# Patient Record
Sex: MALE | Race: WHITE | NOT HISPANIC OR LATINO
[De-identification: names, ages, dates, MRNs, and addresses within clinical notes are randomized per-mention and may not be internally consistent; named-entity substitution may affect disease eponyms.]

---

## 2017-01-09 ENCOUNTER — APPOINTMENT (OUTPATIENT)
Dept: VASCULAR SURGERY | Facility: CLINIC | Age: 82
End: 2017-01-09

## 2017-02-03 ENCOUNTER — APPOINTMENT (OUTPATIENT)
Dept: VASCULAR SURGERY | Facility: CLINIC | Age: 82
End: 2017-02-03

## 2017-02-03 VITALS — DIASTOLIC BLOOD PRESSURE: 73 MMHG | SYSTOLIC BLOOD PRESSURE: 121 MMHG | OXYGEN SATURATION: 97 % | HEART RATE: 73 BPM

## 2017-02-10 ENCOUNTER — APPOINTMENT (OUTPATIENT)
Dept: VASCULAR SURGERY | Facility: CLINIC | Age: 82
End: 2017-02-10

## 2017-02-10 VITALS — HEART RATE: 67 BPM | OXYGEN SATURATION: 98 % | DIASTOLIC BLOOD PRESSURE: 75 MMHG | SYSTOLIC BLOOD PRESSURE: 126 MMHG

## 2017-02-17 ENCOUNTER — APPOINTMENT (OUTPATIENT)
Dept: VASCULAR SURGERY | Facility: CLINIC | Age: 82
End: 2017-02-17

## 2017-02-24 ENCOUNTER — APPOINTMENT (OUTPATIENT)
Dept: VASCULAR SURGERY | Facility: CLINIC | Age: 82
End: 2017-02-24

## 2017-03-03 ENCOUNTER — APPOINTMENT (OUTPATIENT)
Dept: VASCULAR SURGERY | Facility: CLINIC | Age: 82
End: 2017-03-03

## 2017-03-17 ENCOUNTER — APPOINTMENT (OUTPATIENT)
Dept: VASCULAR SURGERY | Facility: CLINIC | Age: 82
End: 2017-03-17

## 2017-03-24 ENCOUNTER — APPOINTMENT (OUTPATIENT)
Dept: VASCULAR SURGERY | Facility: CLINIC | Age: 82
End: 2017-03-24

## 2017-03-31 ENCOUNTER — APPOINTMENT (OUTPATIENT)
Dept: VASCULAR SURGERY | Facility: CLINIC | Age: 82
End: 2017-03-31
Payer: MEDICARE

## 2017-03-31 PROCEDURE — 29580 STRAPPING UNNA BOOT: CPT | Mod: LT

## 2017-03-31 PROCEDURE — 99213 OFFICE O/P EST LOW 20 MIN: CPT | Mod: 25

## 2017-04-07 ENCOUNTER — APPOINTMENT (OUTPATIENT)
Dept: VASCULAR SURGERY | Facility: CLINIC | Age: 82
End: 2017-04-07
Payer: MEDICARE

## 2017-04-07 ENCOUNTER — OUTPATIENT (OUTPATIENT)
Dept: OUTPATIENT SERVICES | Facility: HOSPITAL | Age: 82
LOS: 1 days | End: 2017-04-07
Payer: MEDICARE

## 2017-04-07 PROCEDURE — 99212 OFFICE O/P EST SF 10 MIN: CPT | Mod: 25

## 2017-04-07 PROCEDURE — 29580 STRAPPING UNNA BOOT: CPT | Mod: RT

## 2017-04-07 PROCEDURE — 74177 CT ABD & PELVIS W/CONTRAST: CPT

## 2017-04-07 PROCEDURE — 74177 CT ABD & PELVIS W/CONTRAST: CPT | Mod: 26

## 2017-04-14 ENCOUNTER — APPOINTMENT (OUTPATIENT)
Dept: VASCULAR SURGERY | Facility: CLINIC | Age: 82
End: 2017-04-14
Payer: MEDICARE

## 2017-04-14 VITALS — DIASTOLIC BLOOD PRESSURE: 79 MMHG | HEART RATE: 64 BPM | SYSTOLIC BLOOD PRESSURE: 131 MMHG | OXYGEN SATURATION: 97 %

## 2017-04-14 PROCEDURE — 99212 OFFICE O/P EST SF 10 MIN: CPT | Mod: 25

## 2017-04-14 PROCEDURE — 29580 STRAPPING UNNA BOOT: CPT | Mod: LT

## 2017-04-21 ENCOUNTER — APPOINTMENT (OUTPATIENT)
Dept: VASCULAR SURGERY | Facility: CLINIC | Age: 82
End: 2017-04-21
Payer: MEDICARE

## 2017-04-21 VITALS — OXYGEN SATURATION: 94 % | HEART RATE: 93 BPM | SYSTOLIC BLOOD PRESSURE: 143 MMHG | DIASTOLIC BLOOD PRESSURE: 75 MMHG

## 2017-04-21 PROCEDURE — 29580 STRAPPING UNNA BOOT: CPT | Mod: LT

## 2017-04-21 PROCEDURE — 99212 OFFICE O/P EST SF 10 MIN: CPT | Mod: 25

## 2017-04-28 ENCOUNTER — APPOINTMENT (OUTPATIENT)
Dept: VASCULAR SURGERY | Facility: CLINIC | Age: 82
End: 2017-04-28
Payer: MEDICARE

## 2017-04-28 VITALS — SYSTOLIC BLOOD PRESSURE: 121 MMHG | DIASTOLIC BLOOD PRESSURE: 76 MMHG | OXYGEN SATURATION: 92 % | HEART RATE: 75 BPM

## 2017-04-28 PROCEDURE — 29580 STRAPPING UNNA BOOT: CPT | Mod: 50

## 2017-04-28 PROCEDURE — 99212 OFFICE O/P EST SF 10 MIN: CPT | Mod: 25

## 2017-05-05 ENCOUNTER — APPOINTMENT (OUTPATIENT)
Dept: VASCULAR SURGERY | Facility: CLINIC | Age: 82
End: 2017-05-05
Payer: MEDICARE

## 2017-05-05 PROCEDURE — 29580 STRAPPING UNNA BOOT: CPT | Mod: LT

## 2017-05-05 PROCEDURE — 99212 OFFICE O/P EST SF 10 MIN: CPT | Mod: 25

## 2017-05-12 ENCOUNTER — APPOINTMENT (OUTPATIENT)
Dept: VASCULAR SURGERY | Facility: CLINIC | Age: 82
End: 2017-05-12
Payer: MEDICARE

## 2017-05-12 PROCEDURE — 29580 STRAPPING UNNA BOOT: CPT | Mod: 50

## 2017-05-12 PROCEDURE — 99212 OFFICE O/P EST SF 10 MIN: CPT | Mod: 25

## 2017-05-19 ENCOUNTER — APPOINTMENT (OUTPATIENT)
Dept: VASCULAR SURGERY | Facility: CLINIC | Age: 82
End: 2017-05-19
Payer: MEDICARE

## 2017-05-19 VITALS — OXYGEN SATURATION: 96 % | DIASTOLIC BLOOD PRESSURE: 65 MMHG | SYSTOLIC BLOOD PRESSURE: 105 MMHG | HEART RATE: 76 BPM

## 2017-05-19 PROCEDURE — 99212 OFFICE O/P EST SF 10 MIN: CPT | Mod: 25

## 2017-05-19 PROCEDURE — 29580 STRAPPING UNNA BOOT: CPT | Mod: 50

## 2017-05-26 ENCOUNTER — APPOINTMENT (OUTPATIENT)
Dept: VASCULAR SURGERY | Facility: CLINIC | Age: 82
End: 2017-05-26

## 2017-05-26 ENCOUNTER — APPOINTMENT (OUTPATIENT)
Dept: VASCULAR SURGERY | Facility: CLINIC | Age: 82
End: 2017-05-26
Payer: MEDICARE

## 2017-05-26 PROCEDURE — 29580 STRAPPING UNNA BOOT: CPT | Mod: 50

## 2017-05-26 PROCEDURE — 99211 OFF/OP EST MAY X REQ PHY/QHP: CPT | Mod: 25

## 2017-06-01 ENCOUNTER — RESULT CHARGE (OUTPATIENT)
Age: 82
End: 2017-06-01

## 2017-06-02 ENCOUNTER — APPOINTMENT (OUTPATIENT)
Dept: VASCULAR SURGERY | Facility: CLINIC | Age: 82
End: 2017-06-02
Payer: MEDICARE

## 2017-06-02 VITALS — SYSTOLIC BLOOD PRESSURE: 109 MMHG | OXYGEN SATURATION: 95 % | DIASTOLIC BLOOD PRESSURE: 67 MMHG | HEART RATE: 74 BPM

## 2017-06-02 PROCEDURE — 29580 STRAPPING UNNA BOOT: CPT | Mod: 50

## 2017-06-02 PROCEDURE — 99212 OFFICE O/P EST SF 10 MIN: CPT | Mod: 25

## 2017-06-09 ENCOUNTER — APPOINTMENT (OUTPATIENT)
Dept: VASCULAR SURGERY | Facility: CLINIC | Age: 82
End: 2017-06-09

## 2017-06-09 VITALS — OXYGEN SATURATION: 97 % | DIASTOLIC BLOOD PRESSURE: 68 MMHG | SYSTOLIC BLOOD PRESSURE: 113 MMHG | HEART RATE: 71 BPM

## 2017-06-16 ENCOUNTER — APPOINTMENT (OUTPATIENT)
Dept: VASCULAR SURGERY | Facility: CLINIC | Age: 82
End: 2017-06-16

## 2017-06-23 ENCOUNTER — APPOINTMENT (OUTPATIENT)
Dept: VASCULAR SURGERY | Facility: CLINIC | Age: 82
End: 2017-06-23
Payer: MEDICARE

## 2017-06-23 PROCEDURE — 29580 STRAPPING UNNA BOOT: CPT | Mod: LT

## 2017-06-23 PROCEDURE — 99214 OFFICE O/P EST MOD 30 MIN: CPT | Mod: 25

## 2017-06-30 ENCOUNTER — APPOINTMENT (OUTPATIENT)
Dept: VASCULAR SURGERY | Facility: CLINIC | Age: 82
End: 2017-06-30
Payer: MEDICARE

## 2017-06-30 PROCEDURE — 99212 OFFICE O/P EST SF 10 MIN: CPT | Mod: 25

## 2017-06-30 PROCEDURE — 29580 STRAPPING UNNA BOOT: CPT | Mod: LT

## 2017-07-07 ENCOUNTER — APPOINTMENT (OUTPATIENT)
Dept: VASCULAR SURGERY | Facility: CLINIC | Age: 82
End: 2017-07-07

## 2017-07-07 VITALS — HEART RATE: 75 BPM | SYSTOLIC BLOOD PRESSURE: 116 MMHG | OXYGEN SATURATION: 96 % | DIASTOLIC BLOOD PRESSURE: 68 MMHG

## 2017-08-04 ENCOUNTER — APPOINTMENT (OUTPATIENT)
Dept: VASCULAR SURGERY | Facility: CLINIC | Age: 82
End: 2017-08-04
Payer: MEDICARE

## 2017-08-04 PROCEDURE — 99213 OFFICE O/P EST LOW 20 MIN: CPT

## 2017-09-01 ENCOUNTER — APPOINTMENT (OUTPATIENT)
Dept: VASCULAR SURGERY | Facility: CLINIC | Age: 82
End: 2017-09-01
Payer: MEDICARE

## 2017-09-01 PROCEDURE — 99212 OFFICE O/P EST SF 10 MIN: CPT

## 2017-09-04 RX ORDER — BRIMONIDINE TARTRATE 2 MG/MG
0.2 SOLUTION/ DROPS OPHTHALMIC
Qty: 5 | Refills: 0 | Status: COMPLETED | COMMUNITY
Start: 2017-07-24

## 2018-04-16 ENCOUNTER — APPOINTMENT (OUTPATIENT)
Dept: VASCULAR SURGERY | Facility: CLINIC | Age: 83
End: 2018-04-16
Payer: MEDICARE

## 2018-04-16 PROCEDURE — 29580 STRAPPING UNNA BOOT: CPT | Mod: LT

## 2018-04-16 PROCEDURE — 99212 OFFICE O/P EST SF 10 MIN: CPT | Mod: 25

## 2018-04-23 ENCOUNTER — APPOINTMENT (OUTPATIENT)
Dept: VASCULAR SURGERY | Facility: CLINIC | Age: 83
End: 2018-04-23
Payer: MEDICARE

## 2018-04-23 PROCEDURE — 99213 OFFICE O/P EST LOW 20 MIN: CPT | Mod: 25

## 2018-04-23 PROCEDURE — 29580 STRAPPING UNNA BOOT: CPT | Mod: LT

## 2018-04-30 ENCOUNTER — APPOINTMENT (OUTPATIENT)
Dept: VASCULAR SURGERY | Facility: CLINIC | Age: 83
End: 2018-04-30
Payer: MEDICARE

## 2018-04-30 PROCEDURE — 29580 STRAPPING UNNA BOOT: CPT | Mod: LT

## 2018-04-30 PROCEDURE — 99212 OFFICE O/P EST SF 10 MIN: CPT | Mod: 25

## 2018-05-07 ENCOUNTER — APPOINTMENT (OUTPATIENT)
Dept: VASCULAR SURGERY | Facility: CLINIC | Age: 83
End: 2018-05-07
Payer: MEDICARE

## 2018-05-07 PROCEDURE — 99212 OFFICE O/P EST SF 10 MIN: CPT | Mod: 25

## 2018-05-07 PROCEDURE — 29580 STRAPPING UNNA BOOT: CPT | Mod: LT

## 2018-05-14 ENCOUNTER — APPOINTMENT (OUTPATIENT)
Dept: VASCULAR SURGERY | Facility: CLINIC | Age: 83
End: 2018-05-14
Payer: MEDICARE

## 2018-05-14 PROCEDURE — 99212 OFFICE O/P EST SF 10 MIN: CPT | Mod: 25

## 2018-05-14 PROCEDURE — 29580 STRAPPING UNNA BOOT: CPT | Mod: LT

## 2018-05-21 ENCOUNTER — APPOINTMENT (OUTPATIENT)
Dept: VASCULAR SURGERY | Facility: CLINIC | Age: 83
End: 2018-05-21
Payer: MEDICARE

## 2018-05-21 PROCEDURE — 99212 OFFICE O/P EST SF 10 MIN: CPT

## 2018-05-29 ENCOUNTER — APPOINTMENT (OUTPATIENT)
Dept: VASCULAR SURGERY | Facility: CLINIC | Age: 83
End: 2018-05-29
Payer: MEDICARE

## 2018-05-29 PROCEDURE — 29580 STRAPPING UNNA BOOT: CPT | Mod: LT

## 2018-05-29 PROCEDURE — 99214 OFFICE O/P EST MOD 30 MIN: CPT | Mod: 25

## 2018-06-04 ENCOUNTER — APPOINTMENT (OUTPATIENT)
Dept: VASCULAR SURGERY | Facility: CLINIC | Age: 83
End: 2018-06-04
Payer: MEDICARE

## 2018-06-04 PROCEDURE — 99212 OFFICE O/P EST SF 10 MIN: CPT | Mod: 25

## 2018-06-04 PROCEDURE — 29580 STRAPPING UNNA BOOT: CPT | Mod: LT

## 2018-06-11 ENCOUNTER — APPOINTMENT (OUTPATIENT)
Dept: VASCULAR SURGERY | Facility: CLINIC | Age: 83
End: 2018-06-11
Payer: MEDICARE

## 2018-06-11 PROCEDURE — 29580 STRAPPING UNNA BOOT: CPT | Mod: LT

## 2018-06-11 PROCEDURE — 99212 OFFICE O/P EST SF 10 MIN: CPT | Mod: 25

## 2018-06-19 ENCOUNTER — APPOINTMENT (OUTPATIENT)
Dept: VASCULAR SURGERY | Facility: CLINIC | Age: 83
End: 2018-06-19
Payer: MEDICARE

## 2018-06-19 PROCEDURE — 29580 STRAPPING UNNA BOOT: CPT | Mod: LT

## 2018-06-19 PROCEDURE — 99214 OFFICE O/P EST MOD 30 MIN: CPT | Mod: 25

## 2018-06-26 ENCOUNTER — APPOINTMENT (OUTPATIENT)
Dept: VASCULAR SURGERY | Facility: CLINIC | Age: 83
End: 2018-06-26
Payer: MEDICARE

## 2018-06-26 PROCEDURE — 29580 STRAPPING UNNA BOOT: CPT | Mod: LT

## 2018-06-26 PROCEDURE — 99214 OFFICE O/P EST MOD 30 MIN: CPT | Mod: 25

## 2018-07-02 ENCOUNTER — APPOINTMENT (OUTPATIENT)
Dept: VASCULAR SURGERY | Facility: CLINIC | Age: 83
End: 2018-07-02
Payer: MEDICARE

## 2018-07-02 PROCEDURE — 29580 STRAPPING UNNA BOOT: CPT | Mod: LT

## 2018-07-02 PROCEDURE — 99212 OFFICE O/P EST SF 10 MIN: CPT | Mod: 25

## 2018-07-09 ENCOUNTER — APPOINTMENT (OUTPATIENT)
Dept: VASCULAR SURGERY | Facility: CLINIC | Age: 83
End: 2018-07-09
Payer: MEDICARE

## 2018-07-09 PROCEDURE — 99212 OFFICE O/P EST SF 10 MIN: CPT | Mod: 25

## 2018-07-09 PROCEDURE — 29580 STRAPPING UNNA BOOT: CPT

## 2018-07-16 ENCOUNTER — APPOINTMENT (OUTPATIENT)
Dept: VASCULAR SURGERY | Facility: CLINIC | Age: 83
End: 2018-07-16
Payer: MEDICARE

## 2018-07-16 PROCEDURE — 99212 OFFICE O/P EST SF 10 MIN: CPT | Mod: 25

## 2018-07-16 PROCEDURE — 29580 STRAPPING UNNA BOOT: CPT

## 2018-07-23 ENCOUNTER — APPOINTMENT (OUTPATIENT)
Dept: VASCULAR SURGERY | Facility: CLINIC | Age: 83
End: 2018-07-23
Payer: MEDICARE

## 2018-07-23 PROCEDURE — 99212 OFFICE O/P EST SF 10 MIN: CPT | Mod: 25

## 2018-07-23 PROCEDURE — 29580 STRAPPING UNNA BOOT: CPT | Mod: LT

## 2018-07-30 ENCOUNTER — APPOINTMENT (OUTPATIENT)
Dept: VASCULAR SURGERY | Facility: CLINIC | Age: 83
End: 2018-07-30
Payer: MEDICARE

## 2018-07-30 PROCEDURE — 99212 OFFICE O/P EST SF 10 MIN: CPT | Mod: 25

## 2018-07-30 PROCEDURE — 29580 STRAPPING UNNA BOOT: CPT | Mod: LT

## 2018-08-06 ENCOUNTER — APPOINTMENT (OUTPATIENT)
Dept: VASCULAR SURGERY | Facility: CLINIC | Age: 83
End: 2018-08-06
Payer: MEDICARE

## 2018-08-06 PROCEDURE — 99214 OFFICE O/P EST MOD 30 MIN: CPT | Mod: 25

## 2018-08-06 PROCEDURE — 29580 STRAPPING UNNA BOOT: CPT | Mod: LT

## 2018-08-13 ENCOUNTER — APPOINTMENT (OUTPATIENT)
Dept: VASCULAR SURGERY | Facility: CLINIC | Age: 83
End: 2018-08-13
Payer: MEDICARE

## 2018-08-13 PROCEDURE — 29580 STRAPPING UNNA BOOT: CPT | Mod: LT

## 2018-08-13 PROCEDURE — 99214 OFFICE O/P EST MOD 30 MIN: CPT | Mod: 25

## 2018-08-20 ENCOUNTER — APPOINTMENT (OUTPATIENT)
Dept: VASCULAR SURGERY | Facility: CLINIC | Age: 83
End: 2018-08-20
Payer: MEDICARE

## 2018-08-20 PROCEDURE — 29580 STRAPPING UNNA BOOT: CPT | Mod: LT

## 2018-08-20 PROCEDURE — 99212 OFFICE O/P EST SF 10 MIN: CPT | Mod: 25

## 2018-08-27 ENCOUNTER — APPOINTMENT (OUTPATIENT)
Dept: VASCULAR SURGERY | Facility: CLINIC | Age: 83
End: 2018-08-27
Payer: MEDICARE

## 2018-08-27 PROCEDURE — 29580 STRAPPING UNNA BOOT: CPT | Mod: LT

## 2018-08-27 PROCEDURE — 99212 OFFICE O/P EST SF 10 MIN: CPT | Mod: 25

## 2018-09-04 ENCOUNTER — APPOINTMENT (OUTPATIENT)
Dept: VASCULAR SURGERY | Facility: CLINIC | Age: 83
End: 2018-09-04
Payer: MEDICARE

## 2018-09-04 PROCEDURE — 99213 OFFICE O/P EST LOW 20 MIN: CPT

## 2018-10-12 ENCOUNTER — APPOINTMENT (OUTPATIENT)
Age: 83
End: 2018-10-12
Payer: MEDICARE

## 2018-10-12 PROCEDURE — 29580 STRAPPING UNNA BOOT: CPT | Mod: LT

## 2018-10-12 PROCEDURE — 99212 OFFICE O/P EST SF 10 MIN: CPT | Mod: 25

## 2018-10-22 ENCOUNTER — APPOINTMENT (OUTPATIENT)
Dept: VASCULAR SURGERY | Facility: CLINIC | Age: 83
End: 2018-10-22
Payer: MEDICARE

## 2018-10-22 PROCEDURE — 99212 OFFICE O/P EST SF 10 MIN: CPT | Mod: 25

## 2018-10-22 PROCEDURE — 29580 STRAPPING UNNA BOOT: CPT | Mod: LT

## 2018-10-29 ENCOUNTER — APPOINTMENT (OUTPATIENT)
Dept: VASCULAR SURGERY | Facility: CLINIC | Age: 83
End: 2018-10-29
Payer: MEDICARE

## 2018-10-29 ENCOUNTER — OUTPATIENT (OUTPATIENT)
Dept: OUTPATIENT SERVICES | Facility: HOSPITAL | Age: 83
LOS: 1 days | End: 2018-10-29
Payer: MEDICARE

## 2018-10-29 DIAGNOSIS — L89.529 PRESSURE ULCER OF LEFT ANKLE, UNSPECIFIED STAGE: ICD-10-CM

## 2018-10-29 PROCEDURE — 29580 STRAPPING UNNA BOOT: CPT | Mod: LT

## 2018-10-29 PROCEDURE — 87070 CULTURE OTHR SPECIMN AEROBIC: CPT

## 2018-10-29 PROCEDURE — 87075 CULTR BACTERIA EXCEPT BLOOD: CPT

## 2018-10-29 PROCEDURE — 99212 OFFICE O/P EST SF 10 MIN: CPT | Mod: 25

## 2018-10-29 PROCEDURE — 87186 SC STD MICRODIL/AGAR DIL: CPT

## 2018-10-31 LAB
GRAM STN FLD: SIGNIFICANT CHANGE UP
SPECIMEN SOURCE: SIGNIFICANT CHANGE UP

## 2018-11-01 LAB
-  AMPICILLIN: SIGNIFICANT CHANGE UP
-  CEFAZOLIN: SIGNIFICANT CHANGE UP
-  CLINDAMYCIN: SIGNIFICANT CHANGE UP
-  ERYTHROMYCIN: SIGNIFICANT CHANGE UP
-  LEVOFLOXACIN: SIGNIFICANT CHANGE UP
-  LINEZOLID: SIGNIFICANT CHANGE UP
-  OXACILLIN: SIGNIFICANT CHANGE UP
-  PENICILLIN: SIGNIFICANT CHANGE UP
-  RIFAMPIN: SIGNIFICANT CHANGE UP
-  TRIMETHOPRIM/SULFAMETHOXAZOLE: SIGNIFICANT CHANGE UP
-  TRIMETHOPRIM/SULFAMETHOXAZOLE: SIGNIFICANT CHANGE UP
-  VANCOMYCIN: SIGNIFICANT CHANGE UP
-  VANCOMYCIN: SIGNIFICANT CHANGE UP
CULTURE RESULTS: SIGNIFICANT CHANGE UP
METHOD TYPE: SIGNIFICANT CHANGE UP
ORGANISM # SPEC MICROSCOPIC CNT: SIGNIFICANT CHANGE UP
SPECIMEN SOURCE: SIGNIFICANT CHANGE UP

## 2018-11-05 ENCOUNTER — APPOINTMENT (OUTPATIENT)
Dept: VASCULAR SURGERY | Facility: CLINIC | Age: 83
End: 2018-11-05
Payer: MEDICARE

## 2018-11-05 PROCEDURE — 99212 OFFICE O/P EST SF 10 MIN: CPT | Mod: 25

## 2018-11-05 PROCEDURE — 29580 STRAPPING UNNA BOOT: CPT | Mod: LT

## 2018-11-05 RX ORDER — AMOXICILLIN AND CLAVULANATE POTASSIUM 875; 125 MG/1; MG/1
875-125 TABLET, COATED ORAL TWICE DAILY
Qty: 42 | Refills: 0 | Status: ACTIVE | COMMUNITY
Start: 2018-11-05 | End: 1900-01-01

## 2018-11-05 RX ORDER — CLINDAMYCIN HYDROCHLORIDE 300 MG/1
300 CAPSULE ORAL
Qty: 63 | Refills: 0 | Status: ACTIVE | COMMUNITY
Start: 2018-11-05 | End: 1900-01-01

## 2018-11-12 ENCOUNTER — APPOINTMENT (OUTPATIENT)
Dept: VASCULAR SURGERY | Facility: CLINIC | Age: 83
End: 2018-11-12
Payer: MEDICARE

## 2018-11-12 PROCEDURE — 29580 STRAPPING UNNA BOOT: CPT | Mod: LT

## 2018-11-12 PROCEDURE — 99212 OFFICE O/P EST SF 10 MIN: CPT | Mod: 25

## 2018-11-19 ENCOUNTER — APPOINTMENT (OUTPATIENT)
Dept: VASCULAR SURGERY | Facility: CLINIC | Age: 83
End: 2018-11-19
Payer: MEDICARE

## 2018-11-19 PROCEDURE — 29580 STRAPPING UNNA BOOT: CPT | Mod: LT

## 2018-11-19 PROCEDURE — 99212 OFFICE O/P EST SF 10 MIN: CPT | Mod: 25

## 2018-11-26 ENCOUNTER — APPOINTMENT (OUTPATIENT)
Dept: VASCULAR SURGERY | Facility: CLINIC | Age: 83
End: 2018-11-26
Payer: MEDICARE

## 2018-11-26 PROCEDURE — 29580 STRAPPING UNNA BOOT: CPT | Mod: LT

## 2018-11-26 PROCEDURE — 99212 OFFICE O/P EST SF 10 MIN: CPT | Mod: 25

## 2018-12-03 ENCOUNTER — APPOINTMENT (OUTPATIENT)
Dept: VASCULAR SURGERY | Facility: CLINIC | Age: 83
End: 2018-12-03
Payer: MEDICARE

## 2018-12-03 PROCEDURE — 29580 STRAPPING UNNA BOOT: CPT | Mod: LT

## 2018-12-03 PROCEDURE — 99212 OFFICE O/P EST SF 10 MIN: CPT | Mod: 25

## 2018-12-10 ENCOUNTER — APPOINTMENT (OUTPATIENT)
Dept: VASCULAR SURGERY | Facility: CLINIC | Age: 83
End: 2018-12-10
Payer: MEDICARE

## 2018-12-10 PROCEDURE — 99212 OFFICE O/P EST SF 10 MIN: CPT | Mod: 25

## 2018-12-10 PROCEDURE — 29580 STRAPPING UNNA BOOT: CPT | Mod: LT

## 2018-12-17 ENCOUNTER — APPOINTMENT (OUTPATIENT)
Dept: VASCULAR SURGERY | Facility: CLINIC | Age: 83
End: 2018-12-17
Payer: MEDICARE

## 2018-12-17 PROCEDURE — 99212 OFFICE O/P EST SF 10 MIN: CPT | Mod: 25

## 2018-12-17 PROCEDURE — 29580 STRAPPING UNNA BOOT: CPT | Mod: LT

## 2018-12-19 ENCOUNTER — APPOINTMENT (OUTPATIENT)
Dept: VASCULAR SURGERY | Facility: CLINIC | Age: 83
End: 2018-12-19
Payer: MEDICARE

## 2018-12-19 PROCEDURE — 99214 OFFICE O/P EST MOD 30 MIN: CPT | Mod: 25

## 2018-12-19 PROCEDURE — 29580 STRAPPING UNNA BOOT: CPT | Mod: LT

## 2018-12-27 ENCOUNTER — APPOINTMENT (OUTPATIENT)
Dept: VASCULAR SURGERY | Facility: CLINIC | Age: 83
End: 2018-12-27
Payer: MEDICARE

## 2018-12-27 PROCEDURE — 29580 STRAPPING UNNA BOOT: CPT | Mod: LT

## 2018-12-27 PROCEDURE — 99212 OFFICE O/P EST SF 10 MIN: CPT | Mod: 25

## 2018-12-31 ENCOUNTER — APPOINTMENT (OUTPATIENT)
Dept: VASCULAR SURGERY | Facility: CLINIC | Age: 83
End: 2018-12-31

## 2019-01-03 ENCOUNTER — APPOINTMENT (OUTPATIENT)
Dept: VASCULAR SURGERY | Facility: CLINIC | Age: 84
End: 2019-01-03
Payer: MEDICARE

## 2019-01-03 PROCEDURE — 99214 OFFICE O/P EST MOD 30 MIN: CPT | Mod: 25

## 2019-01-03 PROCEDURE — 29580 STRAPPING UNNA BOOT: CPT | Mod: LT

## 2019-01-10 ENCOUNTER — APPOINTMENT (OUTPATIENT)
Dept: VASCULAR SURGERY | Facility: CLINIC | Age: 84
End: 2019-01-10
Payer: MEDICARE

## 2019-01-10 PROCEDURE — 99213 OFFICE O/P EST LOW 20 MIN: CPT | Mod: 25

## 2019-01-10 PROCEDURE — 29580 STRAPPING UNNA BOOT: CPT | Mod: LT

## 2019-01-10 RX ORDER — FLUOCINONIDE 0.5 MG/G
0.05 CREAM TOPICAL TWICE DAILY
Qty: 30 | Refills: 1 | Status: ACTIVE | COMMUNITY
Start: 2019-01-10 | End: 1900-01-01

## 2019-01-17 ENCOUNTER — APPOINTMENT (OUTPATIENT)
Dept: VASCULAR SURGERY | Facility: CLINIC | Age: 84
End: 2019-01-17
Payer: MEDICARE

## 2019-01-17 PROCEDURE — 29580 STRAPPING UNNA BOOT: CPT | Mod: LT

## 2019-01-17 PROCEDURE — 99212 OFFICE O/P EST SF 10 MIN: CPT | Mod: 25

## 2019-01-24 ENCOUNTER — APPOINTMENT (OUTPATIENT)
Dept: VASCULAR SURGERY | Facility: CLINIC | Age: 84
End: 2019-01-24
Payer: MEDICARE

## 2019-01-24 PROCEDURE — 29580 STRAPPING UNNA BOOT: CPT | Mod: LT

## 2019-01-24 PROCEDURE — 99213 OFFICE O/P EST LOW 20 MIN: CPT | Mod: 25

## 2019-02-04 ENCOUNTER — APPOINTMENT (OUTPATIENT)
Dept: VASCULAR SURGERY | Facility: CLINIC | Age: 84
End: 2019-02-04
Payer: MEDICARE

## 2019-02-04 PROCEDURE — 99212 OFFICE O/P EST SF 10 MIN: CPT | Mod: 25

## 2019-02-04 PROCEDURE — 29580 STRAPPING UNNA BOOT: CPT | Mod: LT

## 2019-02-06 NOTE — ASSESSMENT
[FreeTextEntry1] : 89 y/o M with LLE wound. left UNNA boot wrapped in the office. F/u here in 1 week.

## 2019-02-06 NOTE — PHYSICAL EXAM
[Respiratory Effort] : normal respiratory effort [Alert] : alert [Calm] : calm [JVD] : no jugular venous distention  [de-identified] : Well appearing NAD [de-identified] : NCAT [de-identified] : FROM [de-identified] : inframalleolar wound to LLE about 1.0 x 1.5 cm with immature epithelium, mild edema

## 2019-02-06 NOTE — HISTORY OF PRESENT ILLNESS
[FreeTextEntry1] : 89 y/o M here for for follow-up of wound to left lower extremity. patient has been seeing Dr. Enrique for UNNA boot therapy for the last month.

## 2019-02-07 ENCOUNTER — APPOINTMENT (OUTPATIENT)
Dept: VASCULAR SURGERY | Facility: CLINIC | Age: 84
End: 2019-02-07
Payer: MEDICARE

## 2019-02-07 PROCEDURE — 29580 STRAPPING UNNA BOOT: CPT | Mod: LT

## 2019-02-07 PROCEDURE — 99213 OFFICE O/P EST LOW 20 MIN: CPT | Mod: 25

## 2019-02-14 ENCOUNTER — APPOINTMENT (OUTPATIENT)
Dept: VASCULAR SURGERY | Facility: CLINIC | Age: 84
End: 2019-02-14
Payer: MEDICARE

## 2019-02-14 PROCEDURE — 99211 OFF/OP EST MAY X REQ PHY/QHP: CPT

## 2019-02-21 ENCOUNTER — APPOINTMENT (OUTPATIENT)
Dept: VASCULAR SURGERY | Facility: CLINIC | Age: 84
End: 2019-02-21

## 2019-05-10 ENCOUNTER — APPOINTMENT (OUTPATIENT)
Dept: VASCULAR SURGERY | Facility: CLINIC | Age: 84
End: 2019-05-10
Payer: MEDICARE

## 2019-05-10 PROCEDURE — 99212 OFFICE O/P EST SF 10 MIN: CPT | Mod: 25

## 2019-05-10 PROCEDURE — 29580 STRAPPING UNNA BOOT: CPT | Mod: RT

## 2019-05-16 NOTE — PHYSICAL EXAM
[Respiratory Effort] : normal respiratory effort [2+] : right 2+ [Skin Ulcer] : ulcer [Alert] : alert [Calm] : calm [JVD] : no jugular venous distention  [de-identified] : NCAT [de-identified] : FROM [de-identified] : Well appearing NAD [de-identified] : RLE: + mild edema, + 0.5 x 0.5 cm ulceration over lateral  malleolus, no signs of infection

## 2019-05-16 NOTE — REVIEW OF SYSTEMS
[Limb Pain] : limb pain [As Noted in HPI] : as noted in HPI [Skin Lesions] : skin lesion [Negative] : Heme/Lymph [Fever] : no fever [Chills] : no chills [Lower Ext Edema] : no extremity edema [Leg Claudication] : no intermittent leg claudication [Limb Swelling] : no limb swelling

## 2019-05-16 NOTE — HISTORY OF PRESENT ILLNESS
[FreeTextEntry1] : 89 y/o M here for for follow-up due to a new ankle ulcer of right  lower extremity. Patient had left LE ulcer that is finally healed in February of this year and he was doing well since then with compression stockings. Patient states that he recently sustained a fall in his house and had a hip fracture that didn't require any intervention. He denies fever, chills, drainage from the ulceration, but states that the area is sensitive to touch.

## 2019-05-16 NOTE — ASSESSMENT
[Ulcer Care] : ulcer care [FreeTextEntry1] : 89 y/o M here for for follow-up due to a new ankle ulcer of right  lower \par extremity. Right UNNA boot wrapped in the office. F/u here in 1 week.

## 2019-05-17 ENCOUNTER — APPOINTMENT (OUTPATIENT)
Dept: VASCULAR SURGERY | Facility: CLINIC | Age: 84
End: 2019-05-17
Payer: MEDICARE

## 2019-05-17 PROCEDURE — 99212 OFFICE O/P EST SF 10 MIN: CPT | Mod: 25

## 2019-05-17 PROCEDURE — 29580 STRAPPING UNNA BOOT: CPT

## 2019-05-20 NOTE — PHYSICAL EXAM
[] : present [Varicose Veins Of Lower Extremities] : not present [Ankle Swelling (On Exam)] : not present [2+] : left 2+ [de-identified] : pleasant [Skin Ulcer] : ulcer [de-identified] : ulcer very slow healing and slightly bigger

## 2019-05-20 NOTE — REVIEW OF SYSTEMS
[Feeling Poorly] : feeling poorly [Joint Pain] : joint pain [Arthralgias] : arthralgias [Limb Pain] : limb pain [Skin Wound] : skin wound

## 2019-05-24 ENCOUNTER — APPOINTMENT (OUTPATIENT)
Dept: VASCULAR SURGERY | Facility: CLINIC | Age: 84
End: 2019-05-24
Payer: MEDICARE

## 2019-05-24 PROCEDURE — 29580 STRAPPING UNNA BOOT: CPT | Mod: RT

## 2019-05-24 PROCEDURE — 99214 OFFICE O/P EST MOD 30 MIN: CPT | Mod: 25

## 2019-05-24 NOTE — PHYSICAL EXAM
[Normal Breath Sounds] : Normal breath sounds [2+] : left 2+ [] : present [Skin Ulcer] : ulcer [Alert] : alert [Calm] : calm [JVD] : no jugular venous distention  [Ankle Swelling (On Exam)] : not present [Varicose Veins Of Lower Extremities] : not present [de-identified] : NAD, pleasant  [de-identified] : 1x1 cm punctate venous ulcer of posterolateral R ankle; no erythema or drainage

## 2019-05-24 NOTE — HISTORY OF PRESENT ILLNESS
[FreeTextEntry1] : 89 M w/ chronic venous insufficiency and poorly healing ulcer of RLE on posterolateral aspect of R ankle. Receiving unna boot treatment. Last seen 1 week ago and reports minimal improvement. Still has pain in the foot and ankle. Otherwise no other complaints. No fevers, chills, or rigors. \par

## 2019-05-24 NOTE — ASSESSMENT
[FreeTextEntry1] : 89 M w/ chronic venous insufficiency and poorly healing ulcer of RLE on posterolateral aspect of R ankle. Ulcer remains open w/ minimal improvement. No signs of infections. Unna boot re-applied to RLE\par \par Plan:\par - continue unna boot\par - f/u in 1 week [Foot care/Footwear] : foot care/footwear [Ulcer Care] : ulcer care

## 2019-05-24 NOTE — REVIEW OF SYSTEMS
[Feeling Poorly] : feeling poorly [Joint Pain] : joint pain [Arthralgias] : arthralgias [Skin Wound] : skin wound [Limb Pain] : limb pain

## 2019-05-31 ENCOUNTER — APPOINTMENT (OUTPATIENT)
Dept: VASCULAR SURGERY | Facility: CLINIC | Age: 84
End: 2019-05-31
Payer: MEDICARE

## 2019-05-31 PROCEDURE — 29580 STRAPPING UNNA BOOT: CPT | Mod: RT

## 2019-05-31 PROCEDURE — 99212 OFFICE O/P EST SF 10 MIN: CPT | Mod: 25

## 2019-05-31 NOTE — REVIEW OF SYSTEMS
[Feeling Poorly] : feeling poorly [Arthralgias] : arthralgias [Joint Pain] : joint pain [Limb Pain] : limb pain [Skin Wound] : skin wound

## 2019-06-04 NOTE — HISTORY OF PRESENT ILLNESS
[FreeTextEntry1] : 89 M w/ chronic venous insufficiency and poorly healing ulcer of RLE on posterolateral aspect of R ankle. Seen last week. Continues to receive unna boot treatment and reports some improvement. Still has pain in the foot and ankle. Otherwise no other complaints. No fevers, chills, or rigors. \par

## 2019-06-04 NOTE — ASSESSMENT
[Foot care/Footwear] : foot care/footwear [Ulcer Care] : ulcer care [FreeTextEntry1] : 89 M w/ chronic venous insufficiency and poorly healing ulcer of RLE on posterolateral aspect of R ankle. Ulcer remains open w/ improvement. No signs of infections. Unna boot re-applied to RLE  Plan: - continue unna boot - f/u in 1 week

## 2019-06-04 NOTE — PHYSICAL EXAM
[Normal Breath Sounds] : Normal breath sounds [2+] : left 2+ [] : present [Skin Ulcer] : ulcer [Alert] : alert [Calm] : calm [JVD] : no jugular venous distention  [Varicose Veins Of Lower Extremities] : not present [Ankle Swelling (On Exam)] : not present [de-identified] : 1x1 cm punctate venous ulcer of posterolateral R ankle; no erythema or drainage [de-identified] : NAD, pleasant

## 2019-06-07 ENCOUNTER — APPOINTMENT (OUTPATIENT)
Dept: VASCULAR SURGERY | Facility: CLINIC | Age: 84
End: 2019-06-07
Payer: MEDICARE

## 2019-06-07 PROCEDURE — 99212 OFFICE O/P EST SF 10 MIN: CPT

## 2019-06-07 RX ORDER — SILVER SULFADIAZINE 10 MG/G
1 CREAM TOPICAL DAILY
Qty: 1 | Refills: 1 | Status: ACTIVE | COMMUNITY
Start: 2019-06-07 | End: 1900-01-01

## 2019-06-10 NOTE — REVIEW OF SYSTEMS
[Feeling Poorly] : feeling poorly [Arthralgias] : arthralgias [Joint Pain] : joint pain [Skin Wound] : skin wound [Limb Pain] : limb pain

## 2019-06-13 NOTE — PHYSICAL EXAM
[Normal Breath Sounds] : Normal breath sounds [2+] : left 2+ [] : present [Alert] : alert [Skin Ulcer] : ulcer [Calm] : calm [JVD] : no jugular venous distention  [de-identified] : NAD, pleasant  [Ankle Swelling (On Exam)] : not present [Varicose Veins Of Lower Extremities] : not present [de-identified] : 1x1 cm punctate venous ulcer of posterolateral R ankle; no erythema or drainage

## 2019-06-13 NOTE — ASSESSMENT
[Ulcer Care] : ulcer care [FreeTextEntry1] : 89 M w/ chronic venous insufficiency and poorly healing ulcer of RLE on posterolateral aspect of R ankle. Ulcer remains open w/ no improvement. No signs of infection. Silvadene was applied and the wound was wrapped with an ACE bandage. \par Ulcer is not healing, therefore we will stop unna boot treatment for now.\par Patient was prescribed a compound solution of neomycin and amphotericin to apply as a wet to dry dressing. Follow up in 2 weeks.

## 2019-06-13 NOTE — HISTORY OF PRESENT ILLNESS
[FreeTextEntry1] : 89 M w/ chronic venous insufficiency and poorly healing ulcer of RLE on posterolateral aspect of R ankle. Seen last week. Was receiving unna boot treatment. He presents today with complaints of pain in the R foot and ankle and reports that the ulcer still is not healing and its very sensitive . No fevers, chills, or rigors. \par

## 2019-06-14 ENCOUNTER — APPOINTMENT (OUTPATIENT)
Dept: VASCULAR SURGERY | Facility: CLINIC | Age: 84
End: 2019-06-14

## 2019-06-28 ENCOUNTER — APPOINTMENT (OUTPATIENT)
Dept: VASCULAR SURGERY | Facility: CLINIC | Age: 84
End: 2019-06-28
Payer: MEDICARE

## 2019-06-28 PROCEDURE — 99213 OFFICE O/P EST LOW 20 MIN: CPT

## 2019-06-28 PROCEDURE — 93971 EXTREMITY STUDY: CPT

## 2019-07-01 NOTE — PHYSICAL EXAM
[2+] : left 2+ [Ankle Swelling (On Exam)] : not present [Varicose Veins Of Lower Extremities] : not present [] : present [Skin Ulcer] : ulcer [de-identified] : pleasant [de-identified] : ulcer very slow healing

## 2019-07-01 NOTE — ASSESSMENT
[FreeTextEntry1] : he has a  that is near the ulcer on the rt leg\par  [Foot care/Footwear] : foot care/footwear [Ulcer Care] : ulcer care

## 2019-07-01 NOTE — HISTORY OF PRESENT ILLNESS
[FreeTextEntry1] : pt comes in for follow up\par he has been putting the ointment on his leg\par pain is in his toes \par the ulcer is less painful\par \par no fever\par

## 2019-07-08 ENCOUNTER — APPOINTMENT (OUTPATIENT)
Dept: VASCULAR SURGERY | Facility: CLINIC | Age: 84
End: 2019-07-08
Payer: MEDICARE

## 2019-07-08 ENCOUNTER — APPOINTMENT (OUTPATIENT)
Dept: VASCULAR SURGERY | Facility: CLINIC | Age: 84
End: 2019-07-08

## 2019-07-08 PROCEDURE — 36471 NJX SCLRSNT MLT INCMPTNT VN: CPT

## 2019-07-08 NOTE — ADDENDUM
[FreeTextEntry1] : This note was written by Renata Lyon on 07/08/2019  acting as scribe for Dr. Naranjo.

## 2019-07-08 NOTE — PROCEDURE
[FreeTextEntry1] : Ultrasound guided sclerotherapy oF RLE [FreeTextEntry3] : Indications: spider veins\par Procedure: Consent was obtained. Patient was placed in comfortable position, legs were prepped with alcohol, 1% sotradecal mixed with 0.9% sodium bicarbonate was injected into the veins and pressure dressing with cotton balls and tape was applied. Patient tolerated the procedure well. Legs were wrapped with ACE bandage, patient advised to leave bandage on for 24 hours.

## 2019-07-08 NOTE — END OF VISIT
[FreeTextEntry3] : All medical record entries made by the Scribe were at my, Dr. Blount's, discretion and personally dictated by me on 07/08/2019 . I have reviewed the chart and agree that the record accurately reflects my personal performance of the history, physical exam, assessment and plan. I have also personally directed, reviewed and agreed to the chart.

## 2019-07-09 ENCOUNTER — APPOINTMENT (OUTPATIENT)
Age: 84
End: 2019-07-09

## 2019-07-09 ENCOUNTER — APPOINTMENT (OUTPATIENT)
Dept: VASCULAR SURGERY | Facility: CLINIC | Age: 84
End: 2019-07-09
Payer: MEDICARE

## 2019-07-09 PROCEDURE — 93971 EXTREMITY STUDY: CPT

## 2019-07-22 ENCOUNTER — APPOINTMENT (OUTPATIENT)
Age: 84
End: 2019-07-22
Payer: MEDICARE

## 2019-07-22 PROCEDURE — 99212 OFFICE O/P EST SF 10 MIN: CPT

## 2019-07-22 NOTE — END OF VISIT
[FreeTextEntry3] : All medical record entries made by the Scribe were at my, Dr. Blount's, discretion and personally dictated by me on 07/22/2019 . I have reviewed the chart and agree that the record accurately reflects my personal performance of the history, physical exam, assessment and plan. I have also personally directed, reviewed and agreed to the chart.

## 2019-07-22 NOTE — ASSESSMENT
[FreeTextEntry1] : 89 y/o M presents today with slow healing venous ulcer on his BLE. Bacitracin was applied and the ulcers were wrapped in ACE bandages.\par F/u in 2 weeks

## 2019-07-22 NOTE — HISTORY OF PRESENT ILLNESS
[FreeTextEntry1] : 89 y/o M presents today for a follow up of venous ulcers on his BLE. Patient reports that he's been putting the ointment on his legs and that the ulcers are less painful and smaller since the last visit. Patient denies fever, chills. \par \par

## 2019-07-22 NOTE — PHYSICAL EXAM
[Normal Breath Sounds] : Normal breath sounds [2+] : left 2+ [Skin Ulcer] : ulcer [] : present [Alert] : alert [Calm] : calm [Ankle Swelling (On Exam)] : not present [JVD] : no jugular venous distention  [de-identified] : pleasant. NAD [Varicose Veins Of Lower Extremities] : not present [de-identified] : NCAT [de-identified] : BLE ulcers very slow healing

## 2019-07-22 NOTE — ADDENDUM
[FreeTextEntry1] : This note was written by Renata Lyon on 07/22/2019  acting as scribe for Dr. Naranjo.

## 2019-08-05 ENCOUNTER — APPOINTMENT (OUTPATIENT)
Dept: VASCULAR SURGERY | Facility: CLINIC | Age: 84
End: 2019-08-05
Payer: MEDICARE

## 2019-08-05 PROCEDURE — 99214 OFFICE O/P EST MOD 30 MIN: CPT

## 2019-08-05 NOTE — PHYSICAL EXAM
[Respiratory Effort] : normal respiratory effort [2+] : left 2+ [Ankle Swelling (On Exam)] : present [Ankle Swelling Bilaterally] : bilaterally  [] : present [Varicose Veins Of Lower Extremities] : not present [Skin Ulcer] : ulcer [de-identified] : NAD, walks with a walker [FreeTextEntry1] : b/l LE pitting edema from foot up to mid-shin\par multiple superficial ulcers: LLE anterior shin, medial malleolus; RLE medial malleolus, overlying Achilles tendon; clean wound base, no purulence, drainage, or surrounding erythema

## 2019-08-05 NOTE — REVIEW OF SYSTEMS
[Fever] : no fever [Chills] : no chills [Leg Claudication] : no intermittent leg claudication [Lower Ext Edema] : lower extremity edema [Limb Pain] : limb pain [Limb Swelling] : limb swelling [Skin Lesions] : skin lesion [As Noted in HPI] : as noted in HPI [Negative] : Heme/Lymph

## 2019-08-05 NOTE — HISTORY OF PRESENT ILLNESS
[FreeTextEntry1] : 89 y/o M presents today for a follow up of venous ulcers on his b/l LE. VNS has been visiting for daily dressing changes. Patient states that some of the ulcers have gotten better while others have gotten worse. Denies fevers/chills. Denies N/V. ROS otherwise negative.

## 2019-08-05 NOTE — ASSESSMENT
[FreeTextEntry1] : 89 y/o M presents today with slow healing venous ulcer on his BLE. Bacitracin was applied and the ulcers were wrapped in ACE bandages.\par \par - Continue current wound care regimen with VNS\par - Bacitracin applied to wounds, wrapped with Kerlix, with ACE wraps\par - f/u in 2 weeks for wound check [Ulcer Care] : ulcer care

## 2019-08-19 ENCOUNTER — APPOINTMENT (OUTPATIENT)
Dept: VASCULAR SURGERY | Facility: CLINIC | Age: 84
End: 2019-08-19
Payer: MEDICARE

## 2019-08-19 PROCEDURE — 99212 OFFICE O/P EST SF 10 MIN: CPT | Mod: 25

## 2019-08-19 PROCEDURE — 29580 STRAPPING UNNA BOOT: CPT | Mod: 50

## 2019-08-21 NOTE — REVIEW OF SYSTEMS
[Lower Ext Edema] : lower extremity edema [Limb Pain] : limb pain [Limb Swelling] : limb swelling [As Noted in HPI] : as noted in HPI [Skin Lesions] : skin lesion [Negative] : Heme/Lymph [Fever] : no fever [Chills] : no chills [Leg Claudication] : no intermittent leg claudication

## 2019-08-21 NOTE — ADDENDUM
[FreeTextEntry1] : I, Akbar Bucio, acted solely as a scribe for Dr. Sheila Blount on this date. 08/19/2019

## 2019-08-21 NOTE — HISTORY OF PRESENT ILLNESS
[FreeTextEntry1] : 89 y/o M presents today for a follow up of BLE edema and venous ulcers. Pt reports pain in his b/l LE. Pt reports that he is compliant with his antibiotic cream and bacitracin treatment.

## 2019-08-21 NOTE — ASSESSMENT
[Ulcer Care] : ulcer care [FreeTextEntry1] : 89 y/o M presents today with venous ulcers healing on his BLE. Bacitracin was applied to patient's wounds and they were wrapped with Kerlix and ACE wraps. I wrote a new prescription for antibiotic cream for patient to apply to wounds daily. \par f/u in 2 weeks.

## 2019-08-21 NOTE — END OF VISIT
[FreeTextEntry3] : All medical record entries made by the Scribe were at my, Dr. Sheila Blount, direction and personally dictated by me on 08/19/2019 I have reviewed the chart and agree that the record accurately reflects my personal performance of the history, physical exam, assessment and plan. I have also personally directed, reviewed and agreed with the chart.  Render Post-Care Instructions In Note?: no Duration Of Freeze Thaw-Cycle (Seconds): 2 Detail Level: Detailed Consent: The patient's consent was obtained including but not limited to risks of crusting, scabbing, blistering, scarring, darker or lighter pigmentary change, recurrence, incomplete removal and infection. Post-Care Instructions: I reviewed with the patient in detail post-care instructions. Patient is to wear sunprotection, and avoid picking at any of the treated lesions. Pt may apply Vaseline to crusted or scabbing areas.

## 2019-08-21 NOTE — PHYSICAL EXAM
[2+] : left 2+ [Ankle Swelling (On Exam)] : present [Ankle Swelling Bilaterally] : bilaterally  [] : present [Skin Ulcer] : ulcer [Normal Breath Sounds] : Normal breath sounds [Alert] : alert [Calm] : calm [JVD] : no jugular venous distention  [Varicose Veins Of Lower Extremities] : not present [de-identified] : NAD, walks with a walker [de-identified] : NCAT [FreeTextEntry1] : b/l LE pitting edema from foot up to mid-shin\par multiple superficial ulcers: LLE anterior shin, medial malleolus; RLE medial malleolus, overlying Achilles tendon; clean wound base, no purulence, drainage, or surrounding erythema

## 2019-09-05 ENCOUNTER — RESULT CHARGE (OUTPATIENT)
Age: 84
End: 2019-09-05

## 2019-09-06 ENCOUNTER — APPOINTMENT (OUTPATIENT)
Dept: VASCULAR SURGERY | Facility: CLINIC | Age: 84
End: 2019-09-06
Payer: MEDICARE

## 2019-09-06 PROCEDURE — 99213 OFFICE O/P EST LOW 20 MIN: CPT

## 2019-09-12 ENCOUNTER — APPOINTMENT (OUTPATIENT)
Dept: VASCULAR SURGERY | Facility: CLINIC | Age: 84
End: 2019-09-12
Payer: MEDICARE

## 2019-09-12 DIAGNOSIS — B49 UNSPECIFIED MYCOSIS: ICD-10-CM

## 2019-09-12 PROCEDURE — 99214 OFFICE O/P EST MOD 30 MIN: CPT

## 2019-09-12 RX ORDER — CHLORHEXIDINE GLUCONATE 213 G/1000ML
4 SOLUTION TOPICAL
Qty: 1 | Refills: 3 | Status: ACTIVE | COMMUNITY
Start: 2019-09-12 | End: 1900-01-01

## 2019-09-12 RX ORDER — CLOTRIMAZOLE 10 MG/G
1 CREAM TOPICAL TWICE DAILY
Qty: 2 | Refills: 2 | Status: ACTIVE | COMMUNITY
Start: 2019-09-12 | End: 1900-01-01

## 2019-09-13 NOTE — ASSESSMENT
[Ulcer Care] : ulcer care [FreeTextEntry1] : 89 y/o M presents today with venous ulcers healing on his BLE. Wounds now worse w/increased pedal edema and new pedal ulcers of the dorsum; b/l LE calamine unna boots applied today.  Recommend wound care evaluation in 1wk w/Dr. Thomas.

## 2019-09-13 NOTE — PHYSICAL EXAM
[Normal Breath Sounds] : Normal breath sounds [2+] : left 2+ [Ankle Swelling Bilaterally] : bilaterally  [Ankle Swelling (On Exam)] : present [] : present [Alert] : alert [Skin Ulcer] : ulcer [Calm] : calm [JVD] : no jugular venous distention  [Varicose Veins Of Lower Extremities] : not present [de-identified] : NAD, walks with a walker [de-identified] : NCAT [FreeTextEntry1] : b/l LE pitting edema from foot up to mid-shin\par multiple superficial ulcers: LLE anterior shin, medial malleolus; RLE medial malleolus, overlying Achilles tendon; clean wound base, no purulence, drainage, or surrounding erythema

## 2019-09-13 NOTE — HISTORY OF PRESENT ILLNESS
[FreeTextEntry1] : 89 y/o M presents today for a follow up of BLE edema and venous ulcers. Pt states his BLE has been getting worse. Pt reports pain in his BLE, "mushy" toes, and swelling around the foot and toes. He notes that these sx are worsen on the RLE than the BLE. He also states that he has been experiencing some  sensitivity up to the calf. There is also an odor from his BLE when unwrapping this bandages. The pt reports that a nurse visits him once a week and wraps bandages around his BLE. There is a loss of sensation on his R foot, he believes it is possibly due to the bandage wrapped too tight\par Smelly, maybe infected\par

## 2019-09-26 ENCOUNTER — APPOINTMENT (OUTPATIENT)
Dept: VASCULAR SURGERY | Facility: CLINIC | Age: 84
End: 2019-09-26
Payer: MEDICARE

## 2019-09-26 PROCEDURE — 29580 STRAPPING UNNA BOOT: CPT | Mod: 50

## 2019-09-26 PROCEDURE — 99214 OFFICE O/P EST MOD 30 MIN: CPT | Mod: 25

## 2019-09-26 RX ORDER — TORSEMIDE 5 MG/1
5 TABLET ORAL
Qty: 12 | Refills: 0 | Status: ACTIVE | COMMUNITY
Start: 2019-09-26 | End: 1900-01-01

## 2019-10-03 ENCOUNTER — APPOINTMENT (OUTPATIENT)
Dept: VASCULAR SURGERY | Facility: CLINIC | Age: 84
End: 2019-10-03
Payer: MEDICARE

## 2019-10-03 ENCOUNTER — OUTPATIENT (OUTPATIENT)
Dept: OUTPATIENT SERVICES | Facility: HOSPITAL | Age: 84
LOS: 1 days | End: 2019-10-03
Payer: MEDICARE

## 2019-10-03 LAB
GRAM STN FLD: SIGNIFICANT CHANGE UP
SPECIMEN SOURCE: SIGNIFICANT CHANGE UP

## 2019-10-03 PROCEDURE — 87184 SC STD DISK METHOD PER PLATE: CPT

## 2019-10-03 PROCEDURE — 29580 STRAPPING UNNA BOOT: CPT | Mod: 50

## 2019-10-03 PROCEDURE — 99213 OFFICE O/P EST LOW 20 MIN: CPT | Mod: 25

## 2019-10-03 PROCEDURE — 87186 SC STD MICRODIL/AGAR DIL: CPT

## 2019-10-03 PROCEDURE — 87075 CULTR BACTERIA EXCEPT BLOOD: CPT

## 2019-10-03 PROCEDURE — 87070 CULTURE OTHR SPECIMN AEROBIC: CPT

## 2019-10-04 DIAGNOSIS — S91.001A UNSPECIFIED OPEN WOUND, RIGHT ANKLE, INITIAL ENCOUNTER: ICD-10-CM

## 2019-10-05 LAB
METHOD TYPE: SIGNIFICANT CHANGE UP

## 2019-10-06 LAB
-  AMPICILLIN: SIGNIFICANT CHANGE UP
-  VANCOMYCIN: SIGNIFICANT CHANGE UP
METHOD TYPE: SIGNIFICANT CHANGE UP

## 2019-10-10 ENCOUNTER — APPOINTMENT (OUTPATIENT)
Dept: VASCULAR SURGERY | Facility: CLINIC | Age: 84
End: 2019-10-10
Payer: MEDICARE

## 2019-10-10 LAB
CULTURE RESULTS: SIGNIFICANT CHANGE UP
ORGANISM # SPEC MICROSCOPIC CNT: SIGNIFICANT CHANGE UP
SPECIMEN SOURCE: SIGNIFICANT CHANGE UP

## 2019-10-10 PROCEDURE — 99214 OFFICE O/P EST MOD 30 MIN: CPT | Mod: 25

## 2019-10-10 PROCEDURE — 29580 STRAPPING UNNA BOOT: CPT | Mod: 50

## 2019-10-10 RX ORDER — CIPROFLOXACIN HYDROCHLORIDE 250 MG/1
250 TABLET, FILM COATED ORAL
Qty: 14 | Refills: 0 | Status: ACTIVE | COMMUNITY
Start: 2019-10-10 | End: 1900-01-01

## 2019-10-17 ENCOUNTER — APPOINTMENT (OUTPATIENT)
Dept: VASCULAR SURGERY | Facility: CLINIC | Age: 84
End: 2019-10-17
Payer: MEDICARE

## 2019-10-17 PROCEDURE — 99214 OFFICE O/P EST MOD 30 MIN: CPT | Mod: 25

## 2019-10-17 PROCEDURE — 29580 STRAPPING UNNA BOOT: CPT | Mod: 50

## 2019-10-24 ENCOUNTER — APPOINTMENT (OUTPATIENT)
Dept: VASCULAR SURGERY | Facility: CLINIC | Age: 84
End: 2019-10-24
Payer: MEDICARE

## 2019-10-24 PROCEDURE — 99214 OFFICE O/P EST MOD 30 MIN: CPT

## 2019-10-24 RX ORDER — GENTAMICIN SULFATE 1 MG/G
0.1 CREAM TOPICAL DAILY
Qty: 1 | Refills: 3 | Status: ACTIVE | COMMUNITY
Start: 2019-10-24 | End: 1900-01-01

## 2019-10-24 RX ORDER — FLUOCINONIDE 0.5 MG/G
0.05 CREAM TOPICAL DAILY
Qty: 1 | Refills: 3 | Status: ACTIVE | COMMUNITY
Start: 2019-10-24 | End: 1900-01-01

## 2019-10-24 RX ORDER — TORSEMIDE 5 MG/1
5 TABLET ORAL DAILY
Qty: 90 | Refills: 3 | Status: ACTIVE | COMMUNITY
Start: 2019-10-24 | End: 1900-01-01

## 2019-11-07 ENCOUNTER — APPOINTMENT (OUTPATIENT)
Dept: VASCULAR SURGERY | Facility: CLINIC | Age: 84
End: 2019-11-07
Payer: MEDICARE

## 2019-11-07 DIAGNOSIS — A49.8 OTHER BACTERIAL INFECTIONS OF UNSPECIFIED SITE: ICD-10-CM

## 2019-11-07 PROCEDURE — 97597 DBRDMT OPN WND 1ST 20 CM/<: CPT

## 2019-11-07 PROCEDURE — 99214 OFFICE O/P EST MOD 30 MIN: CPT | Mod: 25

## 2019-12-05 ENCOUNTER — APPOINTMENT (OUTPATIENT)
Dept: VASCULAR SURGERY | Facility: CLINIC | Age: 84
End: 2019-12-05
Payer: MEDICARE

## 2019-12-05 PROCEDURE — 99214 OFFICE O/P EST MOD 30 MIN: CPT

## 2020-01-02 ENCOUNTER — APPOINTMENT (OUTPATIENT)
Dept: VASCULAR SURGERY | Facility: CLINIC | Age: 85
End: 2020-01-02
Payer: MEDICARE

## 2020-01-02 PROCEDURE — 15852 DRESSING CHANGE NOT FOR BURN: CPT

## 2020-01-02 PROCEDURE — 99214 OFFICE O/P EST MOD 30 MIN: CPT | Mod: 25

## 2020-02-27 ENCOUNTER — APPOINTMENT (OUTPATIENT)
Dept: VASCULAR SURGERY | Facility: CLINIC | Age: 85
End: 2020-02-27
Payer: MEDICARE

## 2020-02-27 PROCEDURE — 99212 OFFICE O/P EST SF 10 MIN: CPT

## 2020-04-09 ENCOUNTER — APPOINTMENT (OUTPATIENT)
Dept: VASCULAR SURGERY | Facility: CLINIC | Age: 85
End: 2020-04-09

## 2020-04-09 ENCOUNTER — APPOINTMENT (OUTPATIENT)
Dept: VASCULAR SURGERY | Facility: CLINIC | Age: 85
End: 2020-04-09
Payer: MEDICARE

## 2020-04-09 DIAGNOSIS — I83.029 VARICOSE VEINS OF LEFT LOWER EXTREMITY WITH ULCER OF UNSPECIFIED SITE: ICD-10-CM

## 2020-04-09 DIAGNOSIS — L97.929 VARICOSE VEINS OF LEFT LOWER EXTREMITY WITH ULCER OF UNSPECIFIED SITE: ICD-10-CM

## 2020-04-09 DIAGNOSIS — L97.909 VARICOSE VEINS OF UNSPECIFIED LOWER EXTREMITY WITH ULCER OF UNSPECIFIED SITE: ICD-10-CM

## 2020-04-09 DIAGNOSIS — I83.009 VARICOSE VEINS OF UNSPECIFIED LOWER EXTREMITY WITH ULCER OF UNSPECIFIED SITE: ICD-10-CM

## 2020-04-09 DIAGNOSIS — L03.119 CELLULITIS OF UNSPECIFIED PART OF LIMB: ICD-10-CM

## 2020-04-09 PROCEDURE — 99214 OFFICE O/P EST MOD 30 MIN: CPT

## 2020-04-09 RX ORDER — CIPROFLOXACIN HYDROCHLORIDE 250 MG/1
250 TABLET, FILM COATED ORAL
Qty: 14 | Refills: 0 | Status: ACTIVE | COMMUNITY
Start: 2020-04-09 | End: 1900-01-01

## 2020-04-09 RX ORDER — SILVER SULFADIAZINE 10 MG/G
1 CREAM TOPICAL DAILY
Qty: 1 | Refills: 3 | Status: ACTIVE | COMMUNITY
Start: 2020-04-09 | End: 1900-01-01

## 2020-04-09 RX ORDER — TORSEMIDE 5 MG/1
5 TABLET ORAL DAILY
Qty: 14 | Refills: 0 | Status: ACTIVE | COMMUNITY
Start: 2020-04-09 | End: 1900-01-01

## 2020-04-23 ENCOUNTER — APPOINTMENT (OUTPATIENT)
Dept: VASCULAR SURGERY | Facility: CLINIC | Age: 85
End: 2020-04-23
Payer: MEDICARE

## 2020-04-23 PROCEDURE — 99213 OFFICE O/P EST LOW 20 MIN: CPT

## 2020-04-23 NOTE — PHYSICAL EXAM
[Ankle Swelling Bilaterally] : bilaterally  [Ankle Swelling (On Exam)] : present [Ankle Swelling On The Right] : mild [de-identified] : pleasant [Calm] : calm [de-identified] : left leg medial ulcer below malleolus-.5x.4mm, slow healing.  Right leg inferior to lateral malleolus ulcer 1x0.8, and achilles area ulcer.  both legs tender to touch

## 2020-04-23 NOTE — REVIEW OF SYSTEMS
[Limb Pain] : limb pain [Negative] : Constitutional [Skin Wound] : skin wound [Limb Swelling] : limb swelling

## 2020-04-23 NOTE — HISTORY OF PRESENT ILLNESS
[FreeTextEntry1] : pt comes in for wound check\par has pain in the right leg \par no fever or chills\par getting wound care at home with VNS-silvadene and compression wrapping

## 2020-05-20 RX ORDER — TORSEMIDE 5 MG/1
5 TABLET ORAL DAILY
Qty: 90 | Refills: 3 | Status: ACTIVE | COMMUNITY
Start: 2020-05-20 | End: 1900-01-01

## 2020-12-23 RX ORDER — TORSEMIDE 5 MG/1
5 TABLET ORAL DAILY
Qty: 30 | Refills: 3 | Status: ACTIVE | COMMUNITY
Start: 2020-12-23 | End: 1900-01-01

## 2021-01-11 ENCOUNTER — APPOINTMENT (OUTPATIENT)
Dept: VASCULAR SURGERY | Facility: CLINIC | Age: 86
End: 2021-01-11
Payer: MEDICARE

## 2021-01-11 PROCEDURE — 99212 OFFICE O/P EST SF 10 MIN: CPT | Mod: 25

## 2021-01-11 PROCEDURE — 29580 STRAPPING UNNA BOOT: CPT | Mod: 50

## 2021-01-11 NOTE — REVIEW OF SYSTEMS
[Limb Pain] : limb pain [Limb Swelling] : limb swelling [Skin Wound] : skin wound [Negative] : Constitutional

## 2021-01-12 NOTE — HISTORY OF PRESENT ILLNESS
[FreeTextEntry1] : 89 y/o M with hx of chronic venous stasis presents for follow up evaluation. Pt reports he was started on Lasix BID by his PMD ~ 2 months ago for his BLE edema, initially he notice significant improvement, however he reports he completed his medication course, but reports having issues refilling his prescription at his pharmacy. His LEs swelling has increased, he is unable to shower on his own. Pt reports he use to have VN twice a week but is now only once a week wrapping his LE with ACE bandages. He has now resumed Lasix with some improvement. He denies any falls, skin breakdown, fever, chills.

## 2021-01-12 NOTE — ADDENDUM
[FreeTextEntry1] : This note was written by Maria Antonia Talbert on 01/11/2021 acting as scribe for Sheila Morris M.D.\par \par I, Sheila De La Vega have read and attest that all the information, medical decision making and discharge instructions within are true and accurate.

## 2021-01-12 NOTE — PROCEDURE
[FreeTextEntry1] : BLE Wound care: Wounds cleaned with hydrogen peroxide, mineral oil applied. Wrapped with unna boot, Kerlix and ACE bandage.\par

## 2021-01-12 NOTE — PHYSICAL EXAM
[Respiratory Effort] : normal respiratory effort [Normal Rate and Rhythm] : normal rate and rhythm [2+] : left 2+ [Ankle Swelling (On Exam)] : present [Ankle Swelling Bilaterally] : bilaterally  [Ankle Swelling On The Left] : moderate [Alert] : alert [Oriented to Person] : oriented to person [Oriented to Place] : oriented to place [Oriented to Time] : oriented to time [Calm] : calm [Varicose Veins Of Lower Extremities] : not present [] : not present [Abdomen Tenderness] : ~T ~M No abdominal tenderness [de-identified] : Well appearing, walks with a walker  [de-identified] : NC/AT  [de-identified] : Supple  [FreeTextEntry1] : B [de-identified] : FROM 5/5 x 4.  [de-identified] : BLE: swelling from below the knee to the feet with very dry, flaky skin. LLE small dime size ulcer to the medial aspect of the calcaneus with active drainage but no signs of infection. Feet are pink, toes are warm to touch with adequate capillary refill.

## 2021-01-12 NOTE — ASSESSMENT
[FreeTextEntry1] : 89 y/o M with chronic venous stasis, LE edema presents for evaluation. On exam, BLE: swelling from below the knee to the feet with very dry, flaky skin. LLE small dime size ulcer to the medial aspect of the calcaneus with active drainage but no signs of infection. Feet are pink, toes are warm to touch with adequate capillary refill. \par \par BLE Wound care: Wounds cleaned with hydrogen peroxide, mineral oil applied. Wrapped with unna boot, Kerlix and ACE bandage.\par \par Pt recommended UNNA boot treatments, will update VNS and to be set up twice a week. Pt instructed to keep his skin well moisturize with mineral oil given the delicacy of his skin and avoid any further skin breakdown. Patient to contact the office in case he develops any concerning symptoms. \par \par

## 2021-04-30 ENCOUNTER — APPOINTMENT (OUTPATIENT)
Dept: VASCULAR SURGERY | Facility: CLINIC | Age: 86
End: 2021-04-30
Payer: MEDICARE

## 2021-04-30 PROCEDURE — 99212 OFFICE O/P EST SF 10 MIN: CPT | Mod: 25

## 2021-04-30 PROCEDURE — 29580 STRAPPING UNNA BOOT: CPT | Mod: LT

## 2021-04-30 NOTE — ADDENDUM
[FreeTextEntry1] : This note was written by Maria Antonia Talbert on 04/30/2021 acting as scribe for Sheila Morris M.D.\par \par I, Sheila De La Vega have read and attest that all the information, medical decision making and discharge instructions within are true and accurate.

## 2021-04-30 NOTE — ASSESSMENT
[FreeTextEntry1] : 89 y/o M with chronic venous stasis, LE edema presents for evaluation. On exam,BLE: swelling from below the knee to the feet with very dry and frail skin. LLE small dime size ulcers over the anterior aspect of the mid shin. Superficial linear abrasion over the medial aspect of the calcaneus with no active drainage and no signs of infection. Toes are warm to touch with very dry skin.\par  \par Plan: \par Unna boot placed over LLE. Patient advised he does not need any further interventions to his LEs. Recommended patient moisturize skin daily 1-2 times a day. Monitor his walking closely to avoid LE trauma given the delicacy of his skin. To follow up here in 1 week for wound care.

## 2021-04-30 NOTE — PHYSICAL EXAM
[Respiratory Effort] : normal respiratory effort [Normal Rate and Rhythm] : normal rate and rhythm [2+] : left 2+ [Ankle Swelling (On Exam)] : present [Ankle Swelling Bilaterally] : bilaterally  [Alert] : alert [Oriented to Person] : oriented to person [Oriented to Place] : oriented to place [Oriented to Time] : oriented to time [Calm] : calm [Ankle Swelling On The Right] : mild [Varicose Veins Of Lower Extremities] : not present [] : not present [Abdomen Tenderness] : ~T ~M No abdominal tenderness [de-identified] : Well appearing, walks with a walker  [de-identified] : NC/AT  [de-identified] : Supple  [de-identified] : FROM 5/5 x 4.  [de-identified] : BLE: swelling from below the knee to the feet with very dry and frail skin. LLE small dime size ulcers over the anterior aspect of the mid shin. Superficial linear abrasion over the medial aspect of the calcaneus with no active drainage and no signs of infection. Toes are warm to touch with very dry skin.

## 2021-04-30 NOTE — HISTORY OF PRESENT ILLNESS
[FreeTextEntry1] : 89 y/o M with hx of chronic venous stasis presents for follow up evaluation. Patient reports feeling well overall; he does mention developing a new wound to his L calcaneus. He was seen by another vascular surgeon who advised patient he would benefit from LLE angiogram. However patient deferred recommended procedure and is here for second opinion. He continues to have visiting nurses at home whom have been providing wound care. Denies any fever or chills. \par \par 
Angel Doyle(Attending)

## 2021-05-21 ENCOUNTER — APPOINTMENT (OUTPATIENT)
Dept: VASCULAR SURGERY | Facility: CLINIC | Age: 86
End: 2021-05-21

## 2021-12-23 ENCOUNTER — APPOINTMENT (OUTPATIENT)
Dept: VASCULAR SURGERY | Facility: CLINIC | Age: 86
End: 2021-12-23
Payer: MEDICARE

## 2021-12-23 DIAGNOSIS — M79.604 PAIN IN RIGHT LEG: ICD-10-CM

## 2021-12-23 PROCEDURE — 99212 OFFICE O/P EST SF 10 MIN: CPT

## 2022-03-17 ENCOUNTER — APPOINTMENT (OUTPATIENT)
Dept: VASCULAR SURGERY | Facility: CLINIC | Age: 87
End: 2022-03-17